# Patient Record
Sex: MALE | Race: WHITE | Employment: FULL TIME | URBAN - METROPOLITAN AREA
[De-identification: names, ages, dates, MRNs, and addresses within clinical notes are randomized per-mention and may not be internally consistent; named-entity substitution may affect disease eponyms.]

---

## 2022-11-22 ENCOUNTER — HOSPITAL ENCOUNTER (OUTPATIENT)
Dept: PREADMISSION TESTING | Age: 47
Discharge: HOME | End: 2022-11-22
Payer: COMMERCIAL

## 2022-11-22 VITALS
OXYGEN SATURATION: 96 % | TEMPERATURE: 98.2 F | HEIGHT: 73 IN | BODY MASS INDEX: 41.75 KG/M2 | DIASTOLIC BLOOD PRESSURE: 88 MMHG | SYSTOLIC BLOOD PRESSURE: 158 MMHG | HEART RATE: 89 BPM | RESPIRATION RATE: 20 BRPM | WEIGHT: 315 LBS

## 2022-11-22 DIAGNOSIS — Z01.818 PRE-OP TESTING: Primary | ICD-10-CM

## 2022-11-22 LAB
ANION GAP SERPL CALCULATED.3IONS-SCNC: 11 MMOL/L (ref 7–16)
BASOPHILS ABSOLUTE: 0.05 E9/L (ref 0–0.2)
BASOPHILS RELATIVE PERCENT: 0.4 % (ref 0–2)
BUN BLDV-MCNC: 31 MG/DL (ref 6–20)
CALCIUM SERPL-MCNC: 10.1 MG/DL (ref 8.6–10.2)
CHLORIDE BLD-SCNC: 99 MMOL/L (ref 98–107)
CO2: 29 MMOL/L (ref 22–29)
CREAT SERPL-MCNC: 1.3 MG/DL (ref 0.7–1.2)
EOSINOPHILS ABSOLUTE: 0.58 E9/L (ref 0.05–0.5)
EOSINOPHILS RELATIVE PERCENT: 4.9 % (ref 0–6)
GFR SERPL CREATININE-BSD FRML MDRD: >60 ML/MIN/1.73
GLUCOSE BLD-MCNC: 98 MG/DL (ref 74–99)
HCT VFR BLD CALC: 46.1 % (ref 37–54)
HEMOGLOBIN: 15.2 G/DL (ref 12.5–16.5)
IMMATURE GRANULOCYTES #: 0.09 E9/L
IMMATURE GRANULOCYTES %: 0.8 % (ref 0–5)
LYMPHOCYTES ABSOLUTE: 1.29 E9/L (ref 1.5–4)
LYMPHOCYTES RELATIVE PERCENT: 10.8 % (ref 20–42)
MCH RBC QN AUTO: 30.8 PG (ref 26–35)
MCHC RBC AUTO-ENTMCNC: 33 % (ref 32–34.5)
MCV RBC AUTO: 93.5 FL (ref 80–99.9)
MONOCYTES ABSOLUTE: 0.87 E9/L (ref 0.1–0.95)
MONOCYTES RELATIVE PERCENT: 7.3 % (ref 2–12)
NEUTROPHILS ABSOLUTE: 9.03 E9/L (ref 1.8–7.3)
NEUTROPHILS RELATIVE PERCENT: 75.8 % (ref 43–80)
PDW BLD-RTO: 13.5 FL (ref 11.5–15)
PLATELET # BLD: 267 E9/L (ref 130–450)
PMV BLD AUTO: 10.3 FL (ref 7–12)
POTASSIUM REFLEX MAGNESIUM: 4.5 MMOL/L (ref 3.5–5)
RBC # BLD: 4.93 E12/L (ref 3.8–5.8)
SODIUM BLD-SCNC: 139 MMOL/L (ref 132–146)
WBC # BLD: 12.3 E9/L (ref 4.5–11.5)

## 2022-11-22 PROCEDURE — 80048 BASIC METABOLIC PNL TOTAL CA: CPT

## 2022-11-22 PROCEDURE — 85025 COMPLETE CBC W/AUTO DIFF WBC: CPT

## 2022-11-22 PROCEDURE — 36415 COLL VENOUS BLD VENIPUNCTURE: CPT

## 2022-11-22 RX ORDER — CHLORTHALIDONE 25 MG/1
25 TABLET ORAL DAILY
COMMUNITY

## 2022-11-22 RX ORDER — CALCIUM CARBONATE 500(1250)
500 TABLET ORAL DAILY
COMMUNITY

## 2022-11-22 RX ORDER — CALCIUM POLYCARBOPHIL 625 MG 625 MG/1
625 TABLET ORAL DAILY
COMMUNITY

## 2022-11-22 RX ORDER — MILK THISTLE 500 MG
CAPSULE ORAL DAILY
COMMUNITY

## 2022-11-22 RX ORDER — GUAIFENESIN/PHENYLPROPANOLAMIN
EXPECTORANT ORAL DAILY
COMMUNITY

## 2022-11-22 RX ORDER — ASCORBIC ACID 500 MG
500 TABLET ORAL 2 TIMES DAILY
COMMUNITY

## 2022-11-22 RX ORDER — SODIUM CHLORIDE 9 MG/ML
INJECTION, SOLUTION INTRAVENOUS CONTINUOUS
Status: CANCELLED | OUTPATIENT
Start: 2022-11-22

## 2022-11-22 RX ORDER — ACETAMINOPHEN 160 MG
TABLET,DISINTEGRATING ORAL DAILY
COMMUNITY

## 2022-11-22 RX ORDER — OMEGA-3 FATTY ACIDS/FISH OIL 300-1000MG
CAPSULE ORAL DAILY
COMMUNITY

## 2022-11-22 RX ORDER — MULTIVIT WITH MINERALS/LUTEIN
1000 TABLET ORAL DAILY
COMMUNITY

## 2022-11-22 RX ORDER — VIT C/B6/B5/MAGNESIUM/HERB 173 50-5-6-5MG
500 CAPSULE ORAL 2 TIMES DAILY
COMMUNITY

## 2022-11-22 RX ORDER — M-VIT,TX,IRON,MINS/CALC/FOLIC 27MG-0.4MG
1 TABLET ORAL DAILY
COMMUNITY

## 2022-11-22 RX ORDER — VITAMIN B COMPLEX
1 CAPSULE ORAL DAILY
COMMUNITY

## 2022-11-22 RX ORDER — LOSARTAN POTASSIUM 100 MG/1
100 TABLET ORAL DAILY
COMMUNITY

## 2022-11-22 RX ORDER — COVID-19 ANTIGEN TEST
600 KIT MISCELLANEOUS EVERY 4 HOURS PRN
COMMUNITY

## 2022-11-22 ASSESSMENT — PAIN DESCRIPTION - LOCATION: LOCATION: FOOT

## 2022-11-22 ASSESSMENT — PAIN SCALES - GENERAL: PAINLEVEL_OUTOF10: 5

## 2022-11-22 ASSESSMENT — PAIN DESCRIPTION - ORIENTATION: ORIENTATION: RIGHT

## 2022-11-22 ASSESSMENT — PAIN DESCRIPTION - ONSET: ONSET: ON-GOING

## 2022-11-22 ASSESSMENT — PAIN DESCRIPTION - DESCRIPTORS: DESCRIPTORS: ACHING;SHOOTING

## 2022-11-22 ASSESSMENT — PAIN DESCRIPTION - PAIN TYPE: TYPE: ACUTE PAIN

## 2022-11-22 NOTE — PROGRESS NOTES
[address] [facility]                                                                                                                    PRE OP INSTRUCTIONS FOR  Susie Ruiz        Date: 11/22/2022    Date of surgery: 11/29/22   Arrival Time: Hospital will call you between 5pm and 7pm Monday evening with your final arrival time for surgery    Do not eat or drink anything after midnight prior to surgery. This includes no water, chewing gum, mints or ice chips. Take the following medications with a small sip of water on the morning of Surgery: none     Diabetics may take evening dose of insulin but none after midnight. If you feel symptomatic or low blood sugar morning of surgery drink 1-2 ounces of apple juice only. Aspirin, Ibuprofen, Advil, Naproxen, Vitamin E and other Anti-inflammatory products should be stopped  before surgery  as directed by your physician. Take Tylenol only unless instructed otherwise by your surgeon. Check with your Doctor regarding stopping Plavix, Coumadin, Lovenox, Eliquis, Effient, or other blood thinners. Do not smoke,use illicit drugs and do not drink any alcoholic beverages 24 hours prior to surgery. You may brush your teeth the morning of surgery. DO NOT SWALLOW WATER    You MUST make arrangements for a responsible adult to take you home after your surgery. You will not be allowed to leave alone or drive yourself home. It is strongly suggested someone stay with you the first 24 hrs. Your surgery will be cancelled if you do not have a ride home. PEDIATRIC PATIENTS ONLY:  A parent/legal guardian must accompany a child scheduled for surgery and plan to stay at the hospital until the child is discharged. Please do not bring other children with you.     Please wear simple, loose fitting clothing to the hospital.  Nancie Dense not bring valuables (money, credit cards, checkbooks, etc.) Do not wear any makeup (including no eye makeup) or nail polish on your fingers or toes. DO NOT wear any jewelry or piercings on day of surgery. All body piercing jewelry must be removed. Shower the night before surgery with _x__Antibacterial soap /SHAYY WIPES________    TOTAL JOINT REPLACEMENT/HYSTERECTOMY PATIENTS ONLY---Remember to bring Blood Bank bracelet to the hospital on the day of surgery. If you have a Living Will and Durable Power of  for Healthcare, please bring in a copy. If appropriate bring crutches, inspirex, WALKER, CANE etc... Notify your Surgeon if you develop any illness between now and surgery time, cough, cold, fever, sore throat, nausea, vomiting, etc.  Please notify your surgeon if you experience dizziness, shortness of breath or blurred vision between now & the time of your surgery. If you have _x__dentures, they will be removed before going to the OR; we will provide you a container. If you wear ___contact lenses or ___glasses, they will be removed; please bring a case for them. To provide excellent care visitors will be limited to 2 in the room at any given time. Please bring picture ID and insurance card. Sleep apnea patients need to bring CPAP SETTINGS to hospital on day of surgery. During flu season no children under the age of 15 are permitted in the hospital for the safety of all patients. Other                   Please call AMBULATORY CARE if you have any further questions.    [address] [facility]     [address]

## 2022-11-29 ENCOUNTER — ANESTHESIA EVENT (OUTPATIENT)
Dept: OPERATING ROOM | Age: 47
End: 2022-11-29
Payer: COMMERCIAL

## 2022-11-29 ENCOUNTER — ANESTHESIA (OUTPATIENT)
Dept: OPERATING ROOM | Age: 47
End: 2022-11-29
Payer: COMMERCIAL

## 2022-11-29 ENCOUNTER — HOSPITAL ENCOUNTER (OUTPATIENT)
Age: 47
Setting detail: OUTPATIENT SURGERY
Discharge: HOME | End: 2022-11-29
Attending: PODIATRIST | Admitting: PODIATRIST
Payer: COMMERCIAL

## 2022-11-29 ENCOUNTER — HOSPITAL ENCOUNTER (OUTPATIENT)
Dept: GENERAL RADIOLOGY | Age: 47
Discharge: HOME | End: 2022-12-01
Attending: PODIATRIST
Payer: COMMERCIAL

## 2022-11-29 VITALS
BODY MASS INDEX: 41.75 KG/M2 | DIASTOLIC BLOOD PRESSURE: 59 MMHG | HEIGHT: 73 IN | SYSTOLIC BLOOD PRESSURE: 116 MMHG | RESPIRATION RATE: 20 BRPM | TEMPERATURE: 98.1 F | HEART RATE: 99 BPM | OXYGEN SATURATION: 94 % | WEIGHT: 315 LBS

## 2022-11-29 DIAGNOSIS — M86.171 ACUTE OSTEOMYELITIS OF RIGHT ANKLE OR FOOT (HCC): ICD-10-CM

## 2022-11-29 DIAGNOSIS — M79.671 PAIN IN RIGHT FOOT: ICD-10-CM

## 2022-11-29 LAB — METER GLUCOSE: 110 MG/DL (ref 74–99)

## 2022-11-29 PROCEDURE — 82962 GLUCOSE BLOOD TEST: CPT

## 2022-11-29 PROCEDURE — 2580000003 HC RX 258: Performed by: ANESTHESIOLOGY

## 2022-11-29 PROCEDURE — 87205 SMEAR GRAM STAIN: CPT

## 2022-11-29 PROCEDURE — 3700000000 HC ANESTHESIA ATTENDED CARE: Performed by: PODIATRIST

## 2022-11-29 PROCEDURE — 87186 SC STD MICRODIL/AGAR DIL: CPT

## 2022-11-29 PROCEDURE — 87206 SMEAR FLUORESCENT/ACID STAI: CPT

## 2022-11-29 PROCEDURE — 88311 DECALCIFY TISSUE: CPT

## 2022-11-29 PROCEDURE — 3700000001 HC ADD 15 MINUTES (ANESTHESIA): Performed by: PODIATRIST

## 2022-11-29 PROCEDURE — 87102 FUNGUS ISOLATION CULTURE: CPT

## 2022-11-29 PROCEDURE — 3600000013 HC SURGERY LEVEL 3 ADDTL 15MIN: Performed by: PODIATRIST

## 2022-11-29 PROCEDURE — 87176 TISSUE HOMOGENIZATION CULTR: CPT

## 2022-11-29 PROCEDURE — 88304 TISSUE EXAM BY PATHOLOGIST: CPT

## 2022-11-29 PROCEDURE — 87116 MYCOBACTERIA CULTURE: CPT

## 2022-11-29 PROCEDURE — 2709999900 HC NON-CHARGEABLE SUPPLY: Performed by: PODIATRIST

## 2022-11-29 PROCEDURE — 6360000002 HC RX W HCPCS

## 2022-11-29 PROCEDURE — 7100000011 HC PHASE II RECOVERY - ADDTL 15 MIN: Performed by: PODIATRIST

## 2022-11-29 PROCEDURE — 3209999900 FLUORO FOR SURGICAL PROCEDURES

## 2022-11-29 PROCEDURE — 7100000010 HC PHASE II RECOVERY - FIRST 15 MIN: Performed by: PODIATRIST

## 2022-11-29 PROCEDURE — 87075 CULTR BACTERIA EXCEPT BLOOD: CPT

## 2022-11-29 PROCEDURE — 87077 CULTURE AEROBIC IDENTIFY: CPT

## 2022-11-29 PROCEDURE — 3600000003 HC SURGERY LEVEL 3 BASE: Performed by: PODIATRIST

## 2022-11-29 PROCEDURE — 87070 CULTURE OTHR SPECIMN AEROBIC: CPT

## 2022-11-29 PROCEDURE — 6370000000 HC RX 637 (ALT 250 FOR IP): Performed by: ANESTHESIOLOGY

## 2022-11-29 PROCEDURE — 2500000003 HC RX 250 WO HCPCS: Performed by: PODIATRIST

## 2022-11-29 PROCEDURE — 2500000003 HC RX 250 WO HCPCS

## 2022-11-29 PROCEDURE — 2580000003 HC RX 258

## 2022-11-29 RX ORDER — SODIUM CHLORIDE 0.9 % (FLUSH) 0.9 %
5-40 SYRINGE (ML) INJECTION PRN
Status: CANCELLED | OUTPATIENT
Start: 2022-11-29

## 2022-11-29 RX ORDER — LIDOCAINE HYDROCHLORIDE 20 MG/ML
INJECTION, SOLUTION INTRAVENOUS PRN
Status: DISCONTINUED | OUTPATIENT
Start: 2022-11-29 | End: 2022-11-29 | Stop reason: SDUPTHER

## 2022-11-29 RX ORDER — OXYCODONE HYDROCHLORIDE AND ACETAMINOPHEN 5; 325 MG/1; MG/1
1 TABLET ORAL ONCE
Status: COMPLETED | OUTPATIENT
Start: 2022-11-29 | End: 2022-11-29

## 2022-11-29 RX ORDER — OXYCODONE HYDROCHLORIDE AND ACETAMINOPHEN 5; 325 MG/1; MG/1
1 TABLET ORAL EVERY 4 HOURS PRN
Qty: 28 TABLET | Refills: 0 | Status: SHIPPED | OUTPATIENT
Start: 2022-11-29 | End: 2022-12-06

## 2022-11-29 RX ORDER — PROPOFOL 10 MG/ML
INJECTION, EMULSION INTRAVENOUS CONTINUOUS PRN
Status: DISCONTINUED | OUTPATIENT
Start: 2022-11-29 | End: 2022-11-29 | Stop reason: SDUPTHER

## 2022-11-29 RX ORDER — BUPIVACAINE HYDROCHLORIDE 5 MG/ML
INJECTION, SOLUTION EPIDURAL; INTRACAUDAL PRN
Status: DISCONTINUED | OUTPATIENT
Start: 2022-11-29 | End: 2022-11-29 | Stop reason: ALTCHOICE

## 2022-11-29 RX ORDER — FENTANYL CITRATE 50 UG/ML
INJECTION, SOLUTION INTRAMUSCULAR; INTRAVENOUS PRN
Status: DISCONTINUED | OUTPATIENT
Start: 2022-11-29 | End: 2022-11-29 | Stop reason: SDUPTHER

## 2022-11-29 RX ORDER — GLYCOPYRROLATE 0.2 MG/ML
INJECTION INTRAMUSCULAR; INTRAVENOUS PRN
Status: DISCONTINUED | OUTPATIENT
Start: 2022-11-29 | End: 2022-11-29 | Stop reason: SDUPTHER

## 2022-11-29 RX ORDER — ONDANSETRON 2 MG/ML
4 INJECTION INTRAMUSCULAR; INTRAVENOUS
Status: CANCELLED | OUTPATIENT
Start: 2022-11-29 | End: 2022-11-30

## 2022-11-29 RX ORDER — MEPERIDINE HYDROCHLORIDE 25 MG/ML
12.5 INJECTION INTRAMUSCULAR; INTRAVENOUS; SUBCUTANEOUS EVERY 5 MIN PRN
Status: CANCELLED | OUTPATIENT
Start: 2022-11-29

## 2022-11-29 RX ORDER — SODIUM CHLORIDE 9 MG/ML
INJECTION, SOLUTION INTRAVENOUS PRN
Status: CANCELLED | OUTPATIENT
Start: 2022-11-29

## 2022-11-29 RX ORDER — SODIUM CHLORIDE 9 MG/ML
INJECTION, SOLUTION INTRAVENOUS CONTINUOUS
Status: DISCONTINUED | OUTPATIENT
Start: 2022-11-29 | End: 2022-11-29 | Stop reason: HOSPADM

## 2022-11-29 RX ORDER — MIDAZOLAM HYDROCHLORIDE 1 MG/ML
INJECTION INTRAMUSCULAR; INTRAVENOUS PRN
Status: DISCONTINUED | OUTPATIENT
Start: 2022-11-29 | End: 2022-11-29 | Stop reason: SDUPTHER

## 2022-11-29 RX ORDER — SODIUM CHLORIDE 0.9 % (FLUSH) 0.9 %
5-40 SYRINGE (ML) INJECTION EVERY 12 HOURS SCHEDULED
Status: CANCELLED | OUTPATIENT
Start: 2022-11-29

## 2022-11-29 RX ORDER — KETAMINE HYDROCHLORIDE 50 MG/ML
INJECTION, SOLUTION, CONCENTRATE INTRAMUSCULAR; INTRAVENOUS PRN
Status: DISCONTINUED | OUTPATIENT
Start: 2022-11-29 | End: 2022-11-29 | Stop reason: SDUPTHER

## 2022-11-29 RX ORDER — SODIUM CHLORIDE 9 MG/ML
INJECTION, SOLUTION INTRAVENOUS CONTINUOUS PRN
Status: DISCONTINUED | OUTPATIENT
Start: 2022-11-29 | End: 2022-11-29 | Stop reason: SDUPTHER

## 2022-11-29 RX ADMIN — OXYCODONE AND ACETAMINOPHEN 1 TABLET: 5; 325 TABLET ORAL at 15:06

## 2022-11-29 RX ADMIN — KETAMINE HYDROCHLORIDE 30 MG: 50 INJECTION INTRAMUSCULAR; INTRAVENOUS at 13:21

## 2022-11-29 RX ADMIN — GLYCOPYRROLATE 0.2 MG: 0.2 INJECTION, SOLUTION INTRAMUSCULAR; INTRAVENOUS at 13:21

## 2022-11-29 RX ADMIN — SODIUM CHLORIDE: 900 INJECTION, SOLUTION INTRAVENOUS at 13:16

## 2022-11-29 RX ADMIN — SODIUM CHLORIDE: 9 INJECTION, SOLUTION INTRAVENOUS at 11:36

## 2022-11-29 RX ADMIN — MIDAZOLAM 2 MG: 1 INJECTION INTRAMUSCULAR; INTRAVENOUS at 13:16

## 2022-11-29 RX ADMIN — PROPOFOL INJECTABLE EMULSION 125 MCG/KG/MIN: 10 INJECTION, EMULSION INTRAVENOUS at 13:21

## 2022-11-29 RX ADMIN — LIDOCAINE HYDROCHLORIDE 100 MG: 20 INJECTION, SOLUTION INTRAVENOUS at 13:21

## 2022-11-29 RX ADMIN — GLYCOPYRROLATE 0.2 MG: 0.2 INJECTION, SOLUTION INTRAMUSCULAR; INTRAVENOUS at 13:28

## 2022-11-29 RX ADMIN — KETAMINE HYDROCHLORIDE 20 MG: 50 INJECTION INTRAMUSCULAR; INTRAVENOUS at 13:27

## 2022-11-29 RX ADMIN — MIDAZOLAM 1 MG: 1 INJECTION INTRAMUSCULAR; INTRAVENOUS at 13:26

## 2022-11-29 RX ADMIN — MIDAZOLAM 1 MG: 1 INJECTION INTRAMUSCULAR; INTRAVENOUS at 13:39

## 2022-11-29 RX ADMIN — FENTANYL CITRATE 100 MCG: 50 INJECTION, SOLUTION INTRAMUSCULAR; INTRAVENOUS at 13:21

## 2022-11-29 ASSESSMENT — PAIN DESCRIPTION - DESCRIPTORS
DESCRIPTORS: ACHING;DULL;SHOOTING
DESCRIPTORS: SORE

## 2022-11-29 ASSESSMENT — PAIN DESCRIPTION - ORIENTATION: ORIENTATION: RIGHT

## 2022-11-29 ASSESSMENT — PAIN SCALES - GENERAL
PAINLEVEL_OUTOF10: 3
PAINLEVEL_OUTOF10: 6

## 2022-11-29 ASSESSMENT — PAIN DESCRIPTION - FREQUENCY: FREQUENCY: CONTINUOUS

## 2022-11-29 ASSESSMENT — PAIN - FUNCTIONAL ASSESSMENT
PAIN_FUNCTIONAL_ASSESSMENT: 0-10
PAIN_FUNCTIONAL_ASSESSMENT: PREVENTS OR INTERFERES SOME ACTIVE ACTIVITIES AND ADLS

## 2022-11-29 ASSESSMENT — PAIN DESCRIPTION - LOCATION: LOCATION: FOOT

## 2022-11-29 NOTE — PROGRESS NOTES
Update History & Physical    The patient's History and Physical of 11 / 29 / 22 was reviewed with the patient and there were no significant changes. I examined the patient and there were no significant changes from the previous History and Physical.    Blood pressure (!) 148/80, pulse 85, temperature 98.2 °F (36.8 °C), temperature source Infrared, resp. rate 15, height 6' 1\" (1.854 m), weight (!) 353 lb (160.1 kg), SpO2 97 %. Plan: The risk, benefits, expected outcome, and alternative to the recommended procedure have been discussed with the patient. Patient understands and wants to proceed with the procedure.     Electronically signed by Jaiden Low DPM on 11/29/22 at 1:06 PM HE Angel DPM   Board Certified Foot and Ankle Surgeon  Office: [phone]  Cell:  [phone]

## 2022-11-29 NOTE — ANESTHESIA PRE PROCEDURE
Department of Anesthesiology  Preprocedure Note       Name:  Amrit Barber   Age:  52 y.o.  :  1975                                          MRN:  [id number]         Date:  2022      Surgeon: Kuldeep Hawkins):  Carlyle Downing DPM    Procedure: Procedure(s): FIFTH METATARSAL HEAD EXCISION RIGHT FOOT, POSSIBLE AMPUTATION FIFTH TOE RIGHT FOOT (CPT 94101, 26801)    Medications prior to admission:   Prior to Admission medications    Medication Sig Start Date End Date Taking? Authorizing Provider   metFORMIN (GLUCOPHAGE) 500 MG tablet Take 500 mg by mouth daily (with breakfast)    Historical Provider, MD   chlorthalidone (HYGROTON) 25 MG tablet Take 25 mg by mouth daily    Historical Provider, MD   losartan (COZAAR) 100 MG tablet Take 100 mg by mouth daily    Historical Provider, MD   Naproxen Sodium (ALEVE) 220 MG CAPS Take 600 mg by mouth every 4 hours as needed for Pain    Historical Provider, MD   Multiple Vitamins-Minerals (THERAPEUTIC MULTIVITAMIN-MINERALS) tablet Take 1 tablet by mouth daily    Historical Provider, MD   GARLIC 3891 PO Take by mouth Daily    Historical Provider, MD   Cholecalciferol (VITAMIN D3) 50 MCG (2000 UT) CAPS Take by mouth daily    Historical Provider, MD   vitamin C (ASCORBIC ACID) 500 MG tablet Take 500 mg by mouth 2 times daily    Historical Provider, MD   calcium carbonate (OSCAL) 500 MG TABS tablet Take 500 mg by mouth daily    Historical Provider, MD   vitamin E 1000 units capsule Take 1,000 Units by mouth daily    Historical Provider, MD   b complex vitamins capsule Take 1 capsule by mouth daily    Historical Provider, MD   polycarbophil (FIBERCON) 625 MG tablet Take 625 mg by mouth daily    Historical Provider, MD   Omega 3 1000 MG CAPS Take by mouth Daily    Historical Provider, MD   turmeric 500 MG CAPS Take 500 mg by mouth in the morning and 500 mg in the evening.     Historical Provider, MD GUPTAION ROOT PO Take by mouth daily    Historical Provider, MD   Saw Palmetto 500 MG CAPS Take by mouth daily    Historical Provider, MD   Milk Thistle 500 MG CAPS Take by mouth Daily    Historical Provider, MD   NONFORMULARY daily BEET ROOT    Historical Provider, MD       Current medications:    Current Facility-Administered Medications   Medication Dose Route Frequency Provider Last Rate Last Admin    0.9 % sodium chloride infusion   IntraVENous Continuous Daily Kang MD 50 mL/hr at 11/29/22 1136 New Bag at 11/29/22 1136       Allergies:  No Known Allergies    Problem List:  There is no problem list on file for this patient. Past Medical History:        Diagnosis Date    Anxiety     Diabetes mellitus ([address]     Hypertension        Past Surgical History:        Procedure Laterality Date    COLONOSCOPY      TOE AMPUTATION Left 05/27/2022    4th toe    VASECTOMY         Social History:    Social History     Tobacco Use    Smoking status: Former     Types: Cigarettes    Smokeless tobacco: Former   Substance Use Topics    Alcohol use: Not Currently                                Counseling given: Not Answered      Vital Signs (Current):   Vitals:    11/29/22 1117   BP: (!) 148/80   Pulse: 85   Resp: 15   Temp: 98.2 °F (36.8 °C)   TempSrc: Infrared   SpO2: 97%   Weight: (!) 353 lb (160.1 kg)   Height: 6' 1\" (1.854 m)                                              BP Readings from Last 3 Encounters:   11/29/22 (!) 148/80   11/22/22 (!) 158/88       NPO Status: Time of last liquid consumption: 2200                        Time of last solid consumption: 2000                        Date of last liquid consumption: 11/28/22                        Date of last solid food consumption: 11/28/22    BMI:   Wt Readings from Last 3 Encounters:   11/29/22 (!) 353 lb (160.1 kg)   11/22/22 (!) 353 lb (160.1 kg)     Body mass index is 46.57 kg/m².     CBC:   Lab Results   Component Value Date/Time    WBC 12.3 11/22/2022 02:20 PM    RBC 4.93 11/22/2022 02:20 PM    HGB 15.2 11/22/2022 02:20 PM    HCT 46.1 11/22/2022 02:20 PM    MCV 93.5 11/22/2022 02:20 PM    RDW 13.5 11/22/2022 02:20 PM     11/22/2022 02:20 PM       CMP:   Lab Results   Component Value Date/Time     11/22/2022 02:20 PM    K 4.5 11/22/2022 02:20 PM    CL 99 11/22/2022 02:20 PM    CO2 29 11/22/2022 02:20 PM    BUN 31 11/22/2022 02:20 PM    CREATININE 1.3 11/22/2022 02:20 PM    LABGLOM >60 11/22/2022 02:20 PM    GLUCOSE 98 11/22/2022 02:20 PM    CALCIUM 10.1 11/22/2022 02:20 PM       POC Tests: No results for input(s): POCGLU, POCNA, POCK, POCCL, POCBUN, POCHEMO, POCHCT in the last 72 hours. Coags: No results found for: PROTIME, INR, APTT    HCG (If Applicable): No results found for: PREGTESTUR, PREGSERUM, HCG, HCGQUANT     ABGs: No results found for: PHART, PO2ART, HYC5OGT, NZF7PAP, BEART, L1RNOHWP     Type & Screen (If Applicable):  No results found for: LABABO, LABRH    Drug/Infectious Status (If Applicable):  No results found for: HIV, HEPCAB    COVID-19 Screening (If Applicable): No results found for: COVID19        Anesthesia Evaluation  Patient summary reviewed  Airway: Mallampati: III  TM distance: >3 FB   Neck ROM: full  Mouth opening: > = 3 FB   Dental: normal exam         Pulmonary:Negative Pulmonary ROS breath sounds clear to auscultation                             Cardiovascular:    (+) hypertension:,         Rhythm: regular  Rate: normal                    Neuro/Psych:   Negative Neuro/Psych ROS              GI/Hepatic/Renal:   (+) morbid obesity          Endo/Other:    (+) DiabetesType II DM, well controlled, , .                 Abdominal:   (+) obese,     Abdomen: soft. Vascular: Other Findings:           Anesthesia Plan      MAC     ASA 2       Induction: intravenous. Anesthetic plan and risks discussed with patient. Plan discussed with CRNA.                     Jose A Mariee MD   11/29/2022

## 2022-11-29 NOTE — OP NOTE
Operative Note      Patient: Amrit Barber  YOB: 1975  MRN: [id number]    Date of Procedure: 11/29/2022    Pre-Op Diagnosis: [facility] [M86.171]    Post-Op Diagnosis: Same       Procedure(s): FIFTH METATARSAL EXCISION RIGHT FOOT;   EXCISIONAL DEBRIDEMENT OF WOUND RIGHT FOOT; ROTATIONAL ADIPOMYOFASCIAL FLAP RIGHT FOOT    Surgeon(s):  Carlyle Downing DPM    Assistant:   Resident: Cristina Ashby DPM    Anesthesia: Monitor Anesthesia Care, preoperative block consisting of 20 cc of 0.5% Marcaine plain reverse Yn block right foot    Estimated Blood Loss (mL): Minimal    Complications: None    Specimens:   ID Type Source Tests Collected by Time Destination   1 : CULTURE SWAB RIGHT FOOT  Tissue Tissue CULTURE, ANAEROBIC, CULTURE, FUNGUS, GRAM STAIN, CULTURE, SURGICAL, CULTURE WITH SMEAR, ACID FAST BACILLIUS Carlyle Downing, [facility] 11/29/2022 1347    2 : BONE CULTURE RIGHT FOOT  Bone Bone CULTURE, ANAEROBIC, CULTURE, FUNGUS, GRAM STAIN, CULTURE, SURGICAL, CULTURE WITH SMEAR, ACID FAST BACILLIUS Carlyle Downing, [facility] 11/29/2022 1348    A : BONE RIGHT FOOT  Bone Bone SURGICAL PATHOLOGY Carlyle Downing, [facility] 11/29/2022 1350        Implants:  * No implants in log *      Drains: * No LDAs found *    Findings:  Fifth metatarsal intraoperatively was noted to be enlarged circumferentially with hollowed medullary canal with cystic lesions noted throughout. The fifth metatarsal was extricated from the proximal aspect of the metatarsal base leaving only the remaining base and styloid process. The remaining bone was noted to be hard and white in appearance with no remaining abnormalities. The surrounding soft tissue was intact and did not appear infected. 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   He has undergone conservative therapy options including offloading, wound care, debridements, having, splinting. He is failed all conservative therapy and would like to proceed forward with surgical mention at this time. All risk and benefits of procedure explained detail including not limited to excessive bleeding, chest pain, recurrence of deformity, infection, limb loss, life loss. Patient acknowledged and understood all was involved with procedure and agreed to proceed forward at this time. No guarantees were made regarding the outcome of the procedure. Detailed Description of Procedure: On November 29, 212 this 55-year-old male transported to the hospital preoperative holding area to the operating room where he was placed on the operating table in the supine position. A timeout performed and all attendance were in agreement the correct limb and procedure. Following induction of IV sedation a local anesthetic block consisting of 20 cc of 0.5% Marcaine plain was administered in a reverse Ny block fashion to the right foot. The right foot was then prepped and draped in usual sterile fashion. No tourniquet was applied to the surgery. The foot was then returned to the operative table of all procedures performed    Excision fifth metatarsal, right foot:  Attention was then directed towards the lateral aspect of the right fifth metatarsal at the Glabrous junction where a 4 and half centimeter linear longitudinal skin incision was made through the skin down to level subcutaneous tissue. Preoperatively there was a 3 cm x 2 cm x 1 cm full-thickness ulcer noted sub fifth metatarsal head. The lateral skin incision was then deepened to subcutaneous tissue and the capsule down to level of bone using combination sharp blunt dissection. Using Gerald periosteal elevator the fifth metatarsal was then freed from the surrounding soft tissue.   Next utilizing a Osten sagittal saw a through and through osteotomy was made at the base of the metatarsal through hard cortical bone. The capital fragment of the fifth metatarsal was then extubated  Soft tissue and sent from the field for pathological microscopic analysis. Of note the fifth metatarsal was noted to be hollowed through the medullary canal and fragmented with poor bone density and quality. All remaining osseous fragments within extubated using a rongeur and passed in the field. Site was then flushed with copious months of sterile saline solution and attention was directed towards the plantar aspect of the lateral right plantar foot where the wound was noted preoperatively follow-up procedures performed    Excisional wound debridement right foot:  The preoperative wound measuring 3 x 2 x 1 cm was then circumscribed using a 15 blade and extirpated in toto revealing a 3.5 x 2.5 x 1.3 cm full-thickness postdebridement wound. The site was then assessed for any remaining necrotic or nonviable tissue of which none was noted. Upon completion the procedure site was flushed with copious muscle sterile saline solution and  attention was directed towards the remaining void to the lateral right foot where the puncture was performed    Rotational adipomyofascial elevation and transfer, right foot:  Attention was then directed towards the abductor digiti minimi quinti muscle which was identified with at least 2 arterial tributaries safely in place. The distal portion of the full thickness adipomyofascial mucle flap was then elevated from the bone and transferred over the soft tissue void and sutured in place using 3-0 Vicryl. Upon completion of the flap adequate coverage overlying the soft tissue void was noted. Attention was then directed towards closure which consisted of 3-0 Vicryl for deep tissue. The skin was requested using 3-0 nylon in a combination horizontal mattress and simple suture fashion.     Upon completion of all procedures that was then flushed with copious amounts of sterile saline solution and when dry lap was utilized to cleanse the skin. A bandage was applied consisting of jumpstart, 4 x 4 gauze, ABD, Kerlix, Webril, Ace bandaging and mild compressive corrective fashion. Patient is then awoken from anesthesia and transported from the operating room to the postanesthesia care unit without vital sign stable no pain distress. All  neurovascular status was noted to be intact the patient's bilateral feet. Following a brief period of postoperative monitoring the patient be discharged home with instructions for nonweightbearing to right lower extremity at all times in surgical shoe. He is to call prescriptions as dispensed. He is to keep all dressings clean dry and intact until first postoperative visit. He is to follow-up with Dr. Sravan Yoon within one week of discharge in the outpatient setting.     Electronically signed by Swapnil Hughes DPM on 11/29/2022 at 2:26 PM

## 2022-11-29 NOTE — BRIEF OP NOTE
Brief Postoperative Note      Patient: Hao Keating  YOB: 1975  MRN: [id number]    Date of Procedure: 11/29/2022    Pre-Op Diagnosis: [facility]el[facility] [M86.171]    Post-Op Diagnosis: Same       Procedure(s): FIFTH METATARSAL EXCISION RIGHT FOOT;   EXCISIONAL DEBRIDEMENT OF WOUND RIGHT FOOT; ROTATIONAL ADIPOMYOFASCIAL FLAP RIGHT FOOT    Surgeon(s):  Chin Lange DPM    Assistant:  Resident: Peyman Thomas DPM    Anesthesia: Monitor Anesthesia Care, 20 cc of preoperative 0.5% Marcaine plain reverse Ny block    Estimated Blood Loss (mL): Minimal    Complications: None    Specimens:   ID Type Source Tests Collected by Time Destination   1 : CULTURE SWAB RIGHT FOOT  Tissue Tissue CULTURE, ANAEROBIC, CULTURE, FUNGUS, GRAM STAIN, CULTURE, SURGICAL, CULTURE WITH SMEAR, ACID FAST BACILLIUS Chin Lange, [facility] 11/29/2022 1347    2 : BONE CULTURE RIGHT FOOT  Bone Bone CULTURE, ANAEROBIC, CULTURE, FUNGUS, GRAM STAIN, CULTURE, SURGICAL, CULTURE WITH SMEAR, ACID FAST BACILLIUS Chin Lange, [facility] 11/29/2022 1348    A : BONE RIGHT FOOT  Bone Bone SURGICAL PATHOLOGY Chin Lange DPM 11/29/2022 1350        Implants:  * No implants in log *      Drains: * No LDAs found *    Findings: Fifth metatarsal intraoperatively was noted to be enlarged circumferentially with hollowed medullary canal with cystic lesions noted throughout. The fifth metatarsal was extricated from the proximal aspect of the metatarsal base leaving only the remaining base and styloid process. The remaining bone was noted to be hard and white in appearance with no remaining abnormalities. The surrounding soft tissue was intact and did not appear infected.     Electronically signed by Peyman Thomas DPM on 11/29/2022 at 2:24 PM

## 2022-11-29 NOTE — DISCHARGE INSTRUCTIONS
General  Post-Op Instructions for Diabetic Patients  BEE Moore., DPM  [phone]    Do not smoke. Inspect the feet daily for blisters, cuts, and scratches. A mirror can aid in seeing the bottom of the feet. Always check between the toes. Wash feet daily. Dry them carefully, especially between the toes. Avoid temperature extremes. Test water with elbow before bathing. If feet feel cold at night, wear socks. Do no apply hot water bottles or heating pads. Do not soak feet in hot water. Do not walk on hot surfaces such as [place] beaches or on the cement around swimming pools. Do not walk barefooted. Do not use chemical agents to remove corns and callouses. Do not use corn plasters. Do not use strong antiseptic solutions on the feet. Do not use adhesive tape on the feet. Inspect the inside of the shoes daily for foreign objects, nail points, torn linings, and rough areas. If your vision is impaired, have a family member inspect your feet daily, trim the nails, and buff down calluses. Do not soak feet. For dry feet, use a very thin coat of lubricating oil such as baby oil. Apply the oil after bathing and drying the feet. Do not put oil or cream between the toes. Consult your physician for detailed instructions. Wear properly fitting stockings. Do not wear mended stockings. Avoid stockings with seams. Change stockings daily. Do not wear garters. Shoes should be comfortable at the time of purchase. Do not depend on shoes to stretch out. Shoes should be made of leather. Running shoes may be worn after checking with your physician. Do not wear shoes without stockings. Do not wear sandals with thongs between the toes. In the wintertime take special precautions. Wear wool socks and protective footgear, such as fleece-lined boots. Cut nails straight across. Do not cut corns and callouses. Follow special instructions from your physician or podiatrist.  Avoid crossing your legs.   This can cause pressure on the nerves and blood vessels. See your physician regularly and be sure that your feet are examined at each visit. Notify your physician or podiatrist at once if you develop a blister or sore on your feet. Be sure to inform your podiatrist that you are diabetic. If any problems occur or if you have any further questions, please call your doctor as soon as possible. If you find that you cannot reach your doctor but feel that your condition needs a doctors attention go to an emergency room  I have received a copy and understand these instructions:     General Post-Op Instructions  BEE Snyder., D.P.M.  [phone]    Post Operative Footcare Instructions:    1. Keep your dressings clean and dry. A clean dressing promotes healthy healing. 2.   DO NOT remove the dressing from your foot/ankle, unless instructed by your doctor. 3.   Elevate your foot above the level of your heart for the first 3-4 days as much as              possible. 4.   Place ice on top of your foot/ankle 15 min. every waking hour for the first 3-4 days. 5.   Take your pain medication, with food,as prescribed. (follow all instructions regarding prescription medications)  6. Wear your surgical shoe at all times, and even to bed the first 3-4 days post-op. 7.   DO NOT take a shower and get your foot wet. 8.   You may take a sponge bath or hang you foot over the side of the bath tub.  9.  Take your temperature daily each morning, and call the doctor if your temp. is higher         than 102.5. 10.  You may Jean Don not walk on your foot as tolerated. 11.  Resume normal diet and medications (unless otherwise instructed by your doctor). 12.  No driving until approved by Dr. Tracie Kennedy  13. You should have a responsible adult with you for at 24 hrs. 1.   Call the office at [phone] to make an appointment.   2.   Your first post-op appointment should be made tomorrow for within 1 week of the surgery. 3.  To contact the doctor, you may do so by calling [phone] or                                          [phone].   4.   Take it easy for the next few days and remember, If you are good to your                          foot, it will be good to you.         If any problems occur or if you have any further questions, please call your doctor as soon as possible. If you find that you cannot reach your doctor but feel that your condition needs a doctors attention go to an emergency room.                           I have received a copy/ and understand these instructions:                           ______________________________________________________                       (signature patient/responsible adult )

## 2022-11-30 LAB
GRAM STAIN ORDERABLE: NORMAL
GRAM STAIN ORDERABLE: NORMAL

## 2022-12-01 LAB
AFB SMEAR: NORMAL
ANAEROBIC CULTURE: NORMAL
ANAEROBIC CULTURE: NORMAL
CULTURE SURGICAL: ABNORMAL
CULTURE SURGICAL: ABNORMAL
ORGANISM: ABNORMAL
ORGANISM: ABNORMAL

## 2022-12-02 LAB — FUNGUS STAIN: NORMAL

## 2022-12-23 NOTE — H&P
Update History & Physical    The patient's History and Physical of 11 / 29 / 22 was reviewed with the patient and there were no significant changes. I examined the patient and there were no significant changes from the previous History and Physical.    Blood pressure (!) 116/59, pulse 99, temperature 98.1 °F (36.7 °C), temperature source Temporal, resp. rate 20, height 6' 1\" (1.854 m), weight (!) 353 lb (160.1 kg), SpO2 94 %. Plan: The risk, benefits, expected outcome, and alternative to the recommended procedure have been discussed with the patient. Patient understands and wants to proceed with the procedure.     Electronically signed by Perry Arreguin DPM on 12/23/22 at 9:45 AM EST

## (undated) DEVICE — CLOTH SURG PREP PREOPERATIVE CHLORHEXIDINE GLUC 2% READYPREP

## (undated) DEVICE — TUBING, SUCTION, 1/4" X 10', STRAIGHT: Brand: MEDLINE

## (undated) DEVICE — DOUBLE BASIN SET: Brand: MEDLINE INDUSTRIES, INC.

## (undated) DEVICE — SYRINGE 20ML LL S/C 50

## (undated) DEVICE — SYRINGE IRRIG 60ML SFT PLIABLE BLB EZ TO GRP 1 HND USE W/

## (undated) DEVICE — PROTECTOR SURG PIN DIA3/8IN STD REFIL GEN W SER WHT FOR

## (undated) DEVICE — GOWN,SIRUS,NONRNF,SETINSLV,XL,20/CS: Brand: MEDLINE

## (undated) DEVICE — BANDAGE,SELF ADHRNT,COFLEX,4"X5YD,STRL: Brand: COLABEL

## (undated) DEVICE — GAUZE,SPONGE,4"X4",16PLY,STRL,LF,10/TRAY: Brand: MEDLINE

## (undated) DEVICE — COVER,LIGHT HANDLE,FLX,1/PK: Brand: MEDLINE INDUSTRIES, INC.

## (undated) DEVICE — BANDAGE,GAUZE,4.5"X4.1YD,STERILE,LF: Brand: MEDLINE

## (undated) DEVICE — INTENDED FOR TISSUE SEPARATION, AND OTHER PROCEDURES THAT REQUIRE A SHARP SURGICAL BLADE TO PUNCTURE OR CUT.: Brand: BARD-PARKER ® STAINLESS STEEL BLADES

## (undated) DEVICE — NDL CNTR 40CT FM MAG: Brand: MEDLINE INDUSTRIES, INC.

## (undated) DEVICE — DRESSING GZ W1XL8IN COT XRFRM N ADH OVERWRAP CURAD

## (undated) DEVICE — MARKER,SKIN,WI/RULER AND LABELS: Brand: MEDLINE

## (undated) DEVICE — SHEET,DRAPE,40X58,STERILE: Brand: MEDLINE

## (undated) DEVICE — DRILL STRYKER REM-B

## (undated) DEVICE — NEEDLE HYPO 25GA L1.5IN BLU POLYPR HUB S STL REG BVL STR

## (undated) DEVICE — SYRINGE MED 10ML LUERLOCK TIP W/O SFTY DISP

## (undated) DEVICE — PRECISION THIN (7.0 X 0.38 X 18.5MM)

## (undated) DEVICE — SPONGE,LAP,12"X12",XR,ST,5/PK,40PK/CS: Brand: MEDLINE

## (undated) DEVICE — GAUZE,SPONGE,4"X4",16PLY,XRAY,STRL,LF: Brand: MEDLINE

## (undated) DEVICE — PACK EXT II SIRUS

## (undated) DEVICE — TOWEL,OR,DSP,ST,BLUE,STD,6/PK,12PK/CS: Brand: MEDLINE

## (undated) DEVICE — APPLICATOR MEDICATED 26 CC SOLUTION HI LT ORNG CHLORAPREP

## (undated) DEVICE — GLOVE ORANGE PI 7   MSG9070